# Patient Record
Sex: MALE | Race: WHITE
[De-identification: names, ages, dates, MRNs, and addresses within clinical notes are randomized per-mention and may not be internally consistent; named-entity substitution may affect disease eponyms.]

---

## 2019-12-12 ENCOUNTER — HOSPITAL ENCOUNTER (EMERGENCY)
Dept: HOSPITAL 96 - M.ERS | Age: 69
Discharge: HOME | End: 2019-12-12
Payer: MEDICARE

## 2019-12-12 ENCOUNTER — HOSPITAL ENCOUNTER (OUTPATIENT)
Dept: HOSPITAL 96 - M.CT | Age: 69
End: 2019-12-12
Attending: FAMILY MEDICINE
Payer: MEDICARE

## 2019-12-12 VITALS — HEIGHT: 66 IN | WEIGHT: 212 LBS | BODY MASS INDEX: 34.07 KG/M2

## 2019-12-12 VITALS — SYSTOLIC BLOOD PRESSURE: 127 MMHG | DIASTOLIC BLOOD PRESSURE: 79 MMHG

## 2019-12-12 DIAGNOSIS — E78.00: ICD-10-CM

## 2019-12-12 DIAGNOSIS — R91.8: Primary | ICD-10-CM

## 2019-12-12 DIAGNOSIS — J18.9: Primary | ICD-10-CM

## 2019-12-12 DIAGNOSIS — Z88.0: ICD-10-CM

## 2019-12-12 LAB
ABSOLUTE BASOPHILS: 0 THOU/UL (ref 0–0.2)
ABSOLUTE EOSINOPHILS: 0.2 THOU/UL (ref 0–0.7)
ABSOLUTE MONOCYTES: 0.7 THOU/UL (ref 0–1.2)
ALBUMIN SERPL-MCNC: 4.1 G/DL (ref 3.4–5)
ALP SERPL-CCNC: 77 U/L (ref 46–116)
ALT SERPL-CCNC: 26 U/L (ref 30–65)
ANION GAP SERPL CALC-SCNC: 8 MMOL/L (ref 7–16)
AST SERPL-CCNC: 16 U/L (ref 15–37)
BASOPHILS NFR BLD AUTO: 0.9 %
BILIRUB SERPL-MCNC: 0.5 MG/DL
BUN SERPL-MCNC: 20 MG/DL (ref 7–18)
CALCIUM SERPL-MCNC: 8.7 MG/DL (ref 8.5–10.1)
CHLORIDE SERPL-SCNC: 105 MMOL/L (ref 98–107)
CO2 SERPL-SCNC: 30 MMOL/L (ref 21–32)
CREAT SERPL-MCNC: 1.3 MG/DL (ref 0.6–1.3)
EOSINOPHIL NFR BLD: 3.6 %
GLUCOSE SERPL-MCNC: 116 MG/DL (ref 70–99)
GRANULOCYTES NFR BLD MANUAL: 63.6 %
HCT VFR BLD CALC: 46.2 % (ref 42–52)
HGB BLD-MCNC: 15.4 GM/DL (ref 14–18)
LYMPHOCYTES # BLD: 0.9 THOU/UL (ref 0.8–5.3)
LYMPHOCYTES NFR BLD AUTO: 17.4 %
MCH RBC QN AUTO: 29.4 PG (ref 26–34)
MCHC RBC AUTO-ENTMCNC: 33.3 G/DL (ref 28–37)
MCV RBC: 88.1 FL (ref 80–100)
MONOCYTES NFR BLD: 14.5 %
MPV: 8 FL. (ref 7.2–11.1)
NEUTROPHILS # BLD: 3.2 THOU/UL (ref 1.6–8.1)
NT-PRO BRAIN NAT PEPTIDE: 218 PG/ML (ref ?–300)
NUCLEATED RBCS: 0 /100WBC
PLATELET COUNT*: 161 THOU/UL (ref 150–400)
POTASSIUM SERPL-SCNC: 4.3 MMOL/L (ref 3.5–5.1)
PROT SERPL-MCNC: 7 G/DL (ref 6.4–8.2)
RBC # BLD AUTO: 5.25 MIL/UL (ref 4.5–6)
RDW-CV: 14 % (ref 10.5–14.5)
SODIUM SERPL-SCNC: 143 MMOL/L (ref 136–145)
WBC # BLD AUTO: 5 THOU/UL (ref 4–11)

## 2019-12-13 NOTE — EKG
[place] [address]
Phone:  [phone]                     ELECTROCARDIOGRAM REPORT      
_______________________________________________________________________________
 
Name:       DONALDO VIVAS                Room:                      [facility]#:  [id number]      Account #:      [id number]  
Admission:  19     Attend Phys:                         
Discharge:  19     Date of Birth:  [birth year]  
         Report #: [id number]
    [id number]
_______________________________________________________________________________
THIS REPORT FOR:  //name//                      
 
                         [facility] ED
                                       
Test Date:    2019               Test Time:    17:36:03
Pat Name:     DONALDO VIVAS            Department:   
Patient ID:   [id number]            Room:          
Gender:       M                        Technician:   
:          1950               Requested By: David Mccord
Order Number: [id number] MD:   Ric Castaneda
                                 Measurements
Intervals                              Axis          
Rate:         80                       P:            36
RI:           150                      QRS:          45
QRSD:         100                      T:            47
QT:           385                                    
QTc:          445                                    
                           Interpretive Statements
Sinus rhythm
Nonspecific T abnormalities, anterior leads
Baseline wander in lead(s) I,V1,V2,V3
No previous ECG available for comparison
 
Electronically Signed On 2019 10:30:30 CST by Ric Castaneda
[email or web address]
 
 
 
 
 
 
 
 
 
 
 
 
 
 
 
 
 
 
  <ELECTRONICALLY SIGNED>
                                           By: Ric Castaneda MD, [facility]      
  19     1030
D: 19 173   _____________________________________
T: 19 173   Ric Castaneda MD, FACC        /EPI

## 2020-03-12 ENCOUNTER — HOSPITAL ENCOUNTER (OUTPATIENT)
Dept: HOSPITAL 96 - M.LAB | Age: 70
End: 2020-03-12
Attending: FAMILY MEDICINE
Payer: MEDICARE

## 2020-03-12 DIAGNOSIS — J43.9: Primary | ICD-10-CM

## 2020-03-12 DIAGNOSIS — R91.1: ICD-10-CM

## 2020-03-12 LAB
BUN SERPL-MCNC: 12 MG/DL (ref 7–18)
CREAT SERPL-MCNC: 1.1 MG/DL (ref 0.6–1.3)

## 2021-11-23 ENCOUNTER — HOSPITAL ENCOUNTER (INPATIENT)
Dept: HOSPITAL 96 - M.ERS | Age: 71
LOS: 4 days | Discharge: HOME | DRG: 177 | End: 2021-11-27
Attending: INTERNAL MEDICINE | Admitting: INTERNAL MEDICINE
Payer: MEDICARE

## 2021-11-23 VITALS — WEIGHT: 210 LBS | HEIGHT: 67.01 IN | BODY MASS INDEX: 32.96 KG/M2

## 2021-11-23 VITALS — DIASTOLIC BLOOD PRESSURE: 73 MMHG | SYSTOLIC BLOOD PRESSURE: 124 MMHG

## 2021-11-23 VITALS — SYSTOLIC BLOOD PRESSURE: 140 MMHG | DIASTOLIC BLOOD PRESSURE: 84 MMHG

## 2021-11-23 DIAGNOSIS — U07.1: Primary | ICD-10-CM

## 2021-11-23 DIAGNOSIS — J12.82: ICD-10-CM

## 2021-11-23 DIAGNOSIS — E78.5: ICD-10-CM

## 2021-11-23 DIAGNOSIS — M19.90: ICD-10-CM

## 2021-11-23 DIAGNOSIS — N40.0: ICD-10-CM

## 2021-11-23 DIAGNOSIS — J96.01: ICD-10-CM

## 2021-11-23 DIAGNOSIS — Z88.0: ICD-10-CM

## 2021-11-23 DIAGNOSIS — I10: ICD-10-CM

## 2021-11-23 LAB
ABSOLUTE BASOPHILS: 0 THOU/UL (ref 0–0.2)
ABSOLUTE EOSINOPHILS: 0 THOU/UL (ref 0–0.7)
ABSOLUTE MONOCYTES: 0.6 THOU/UL (ref 0–1.2)
ALBUMIN SERPL-MCNC: 2.5 G/DL (ref 3.4–5)
ALP SERPL-CCNC: 78 U/L (ref 46–116)
ALT SERPL-CCNC: 110 U/L (ref 30–65)
ANION GAP SERPL CALC-SCNC: 7 MMOL/L (ref 7–16)
AST SERPL-CCNC: 97 U/L (ref 15–37)
BASOPHILS NFR BLD AUTO: 0.2 %
BILIRUB SERPL-MCNC: 1.7 MG/DL
BILIRUB UR-MCNC: (no result) MG/DL
BUN SERPL-MCNC: 20 MG/DL (ref 7–18)
CALCIUM SERPL-MCNC: 8.6 MG/DL (ref 8.5–10.1)
CHLORIDE SERPL-SCNC: 100 MMOL/L (ref 98–107)
CO2 SERPL-SCNC: 29 MMOL/L (ref 21–32)
COLOR UR: YELLOW
CREAT SERPL-MCNC: 1.1 MG/DL (ref 0.6–1.3)
EOSINOPHIL NFR BLD: 0.5 %
GLUCOSE SERPL-MCNC: 150 MG/DL (ref 70–99)
GRANULOCYTES NFR BLD MANUAL: 77.8 %
HCT VFR BLD CALC: 42.5 % (ref 42–52)
HGB BLD-MCNC: 14.6 GM/DL (ref 14–18)
KETONES UR STRIP-MCNC: (no result) MG/DL
LYMPHOCYTES # BLD: 0.9 THOU/UL (ref 0.8–5.3)
LYMPHOCYTES NFR BLD AUTO: 13.3 %
MCH RBC QN AUTO: 29 PG (ref 26–34)
MCHC RBC AUTO-ENTMCNC: 34.4 G/DL (ref 28–37)
MCV RBC: 84.5 FL (ref 80–100)
MONOCYTES NFR BLD: 8.2 %
MPV: 7.3 FL. (ref 7.2–11.1)
MUCUS: (no result) STRN/LPF
NEUTROPHILS # BLD: 5.3 THOU/UL (ref 1.6–8.1)
NUCLEATED RBCS: 0 /100WBC
PLATELET COUNT*: 222 THOU/UL (ref 150–400)
POTASSIUM SERPL-SCNC: 5 MMOL/L (ref 3.5–5.1)
PROT SERPL-MCNC: 7.4 G/DL (ref 6.4–8.2)
PROT UR QL STRIP: (no result)
RBC # BLD AUTO: 5.03 MIL/UL (ref 4.5–6)
RBC # UR STRIP: NEGATIVE /UL
RBC #/AREA URNS HPF: (no result) /HPF (ref 0–2)
RDW-CV: 13.8 % (ref 10.5–14.5)
SODIUM SERPL-SCNC: 136 MMOL/L (ref 136–145)
SP GR UR STRIP: 1.01 (ref 1–1.03)
SQUAMOUS: (no result) /LPF (ref 0–3)
URINE CLARITY: CLEAR
URINE GLUCOSE-RANDOM: NEGATIVE
URINE LEUKOCYTES-REFLEX: NEGATIVE
URINE NITRITE-REFLEX: NEGATIVE
URINE WBC-REFLEX: (no result) /HPF (ref 0–5)
UROBILINOGEN UR STRIP-ACNC: 1 E.U./DL (ref 0.2–1)
WBC # BLD AUTO: 6.9 THOU/UL (ref 4–11)

## 2021-11-23 PROCEDURE — XW033E5 INTRODUCTION OF REMDESIVIR ANTI-INFECTIVE INTO PERIPHERAL VEIN, PERCUTANEOUS APPROACH, NEW TECHNOLOGY GROUP 5: ICD-10-PCS | Performed by: INTERNAL MEDICINE

## 2021-11-23 NOTE — EKG
[place] [address]
Phone:  [phone]                     ELECTROCARDIOGRAM REPORT      
_______________________________________________________________________________
 
Name:         DONALDO VIVAS               Room:          [place] [address]    ADM IN 
[facility]#:    [id number]     Account #:     [id number]  
Admission:    21    Attend Phys:   Marco Saldivar, 
Discharge:                Date of Birth: [birth year]  
Date of Service: 21 1313  Report #:      [id number]
        [id number]
_______________________________________________________________________________
THIS REPORT FOR:  //name//                      
 
                         [facility] ED
                                       
Test Date:    2021               Test Time:    13:13:53
Pat Name:     DONALDO VIVAS            Department:   
Patient ID:   [id number]            Room:         [facility]
Gender:       M                        Technician:   MELANY
:          1950               Requested By: Tj De Los Santos
Order Number: [id number] MD:   Ric Castaneda
                                 Measurements
Intervals                              Axis          
Rate:         74                       P:            48
MS:           163                      QRS:          11
QRSD:         89                       T:            49
QT:           377                                    
QTc:          419                                    
                           Interpretive Statements
Sinus arrhythmia
nonspecific t wave changes
Baseline wander in lead(s) III
Compared to ECG 2019 17:36:03
no change
Electronically Signed On 2021 15:39:33 CST by Ric Castaneda
[email or web address]
 
 
 
 
 
 
 
 
 
 
 
 
 
 
 
 
 
 
 
  <ELECTRONICALLY SIGNED>
                                           By: Ric Castaneda MD, FAC      
  21     1539
D: 21 1313   _____________________________________
T: 21 1313   Ric Castaneda MD, [facility]        /EPI

## 2021-11-24 VITALS — DIASTOLIC BLOOD PRESSURE: 89 MMHG | SYSTOLIC BLOOD PRESSURE: 119 MMHG

## 2021-11-24 VITALS — SYSTOLIC BLOOD PRESSURE: 125 MMHG | DIASTOLIC BLOOD PRESSURE: 81 MMHG

## 2021-11-24 VITALS — SYSTOLIC BLOOD PRESSURE: 131 MMHG | DIASTOLIC BLOOD PRESSURE: 81 MMHG

## 2021-11-24 VITALS — SYSTOLIC BLOOD PRESSURE: 126 MMHG | DIASTOLIC BLOOD PRESSURE: 82 MMHG

## 2021-11-24 VITALS — DIASTOLIC BLOOD PRESSURE: 85 MMHG | SYSTOLIC BLOOD PRESSURE: 153 MMHG

## 2021-11-24 LAB
ABSOLUTE BASOPHILS: 0 THOU/UL (ref 0–0.2)
ABSOLUTE EOSINOPHILS: 0 THOU/UL (ref 0–0.7)
ABSOLUTE MONOCYTES: 0.4 THOU/UL (ref 0–1.2)
ANION GAP SERPL CALC-SCNC: 8 MMOL/L (ref 7–16)
BASOPHILS NFR BLD AUTO: 0.4 %
BUN SERPL-MCNC: 19 MG/DL (ref 7–18)
CALCIUM SERPL-MCNC: 8.7 MG/DL (ref 8.5–10.1)
CHLORIDE SERPL-SCNC: 102 MMOL/L (ref 98–107)
CO2 SERPL-SCNC: 29 MMOL/L (ref 21–32)
CREAT SERPL-MCNC: 1.1 MG/DL (ref 0.6–1.3)
EOSINOPHIL NFR BLD: 0 %
GLUCOSE SERPL-MCNC: 144 MG/DL (ref 70–99)
GRANULOCYTES NFR BLD MANUAL: 77.7 %
HCT VFR BLD CALC: 41.7 % (ref 42–52)
HGB BLD-MCNC: 14.4 GM/DL (ref 14–18)
LYMPHOCYTES # BLD: 0.7 THOU/UL (ref 0.8–5.3)
LYMPHOCYTES NFR BLD AUTO: 13.5 %
MCH RBC QN AUTO: 29.5 PG (ref 26–34)
MCHC RBC AUTO-ENTMCNC: 34.6 G/DL (ref 28–37)
MCV RBC: 85.5 FL (ref 80–100)
MONOCYTES NFR BLD: 8.4 %
MPV: 6.8 FL. (ref 7.2–11.1)
NEUTROPHILS # BLD: 4 THOU/UL (ref 1.6–8.1)
NUCLEATED RBCS: 0 /100WBC
PLATELET COUNT*: 256 THOU/UL (ref 150–400)
POTASSIUM SERPL-SCNC: 4.6 MMOL/L (ref 3.5–5.1)
RBC # BLD AUTO: 4.88 MIL/UL (ref 4.5–6)
RDW-CV: 14 % (ref 10.5–14.5)
SODIUM SERPL-SCNC: 139 MMOL/L (ref 136–145)
WBC # BLD AUTO: 5.1 THOU/UL (ref 4–11)

## 2021-11-25 VITALS — SYSTOLIC BLOOD PRESSURE: 109 MMHG | DIASTOLIC BLOOD PRESSURE: 78 MMHG

## 2021-11-25 VITALS — DIASTOLIC BLOOD PRESSURE: 74 MMHG | SYSTOLIC BLOOD PRESSURE: 111 MMHG

## 2021-11-25 VITALS — SYSTOLIC BLOOD PRESSURE: 120 MMHG | DIASTOLIC BLOOD PRESSURE: 69 MMHG

## 2021-11-25 VITALS — SYSTOLIC BLOOD PRESSURE: 121 MMHG | DIASTOLIC BLOOD PRESSURE: 79 MMHG

## 2021-11-25 VITALS — DIASTOLIC BLOOD PRESSURE: 76 MMHG | SYSTOLIC BLOOD PRESSURE: 129 MMHG

## 2021-11-25 VITALS — SYSTOLIC BLOOD PRESSURE: 140 MMHG | DIASTOLIC BLOOD PRESSURE: 89 MMHG

## 2021-11-25 LAB
ABSOLUTE BASOPHILS: 0 THOU/UL (ref 0–0.2)
ABSOLUTE EOSINOPHILS: 0 THOU/UL (ref 0–0.7)
ABSOLUTE MONOCYTES: 0.8 THOU/UL (ref 0–1.2)
ALBUMIN SERPL-MCNC: 2.8 G/DL (ref 3.4–5)
ALP SERPL-CCNC: 75 U/L (ref 46–116)
ALT SERPL-CCNC: 121 U/L (ref 30–65)
ANION GAP SERPL CALC-SCNC: 8 MMOL/L (ref 7–16)
APTT BLD: 26.4 SECONDS (ref 25–31.3)
AST SERPL-CCNC: 81 U/L (ref 15–37)
BASOPHILS NFR BLD AUTO: 0.1 %
BILIRUB SERPL-MCNC: 1.3 MG/DL
BUN SERPL-MCNC: 23 MG/DL (ref 7–18)
CALCIUM SERPL-MCNC: 8.6 MG/DL (ref 8.5–10.1)
CHLORIDE SERPL-SCNC: 104 MMOL/L (ref 98–107)
CO2 SERPL-SCNC: 28 MMOL/L (ref 21–32)
CREAT SERPL-MCNC: 1.2 MG/DL (ref 0.6–1.3)
EOSINOPHIL NFR BLD: 0 %
GLUCOSE SERPL-MCNC: 105 MG/DL (ref 70–99)
GRANULOCYTES NFR BLD MANUAL: 71.7 %
HCT VFR BLD CALC: 40.7 % (ref 42–52)
HGB BLD-MCNC: 13.9 GM/DL (ref 14–18)
INR PPP: 1
LYMPHOCYTES # BLD: 1.4 THOU/UL (ref 0.8–5.3)
LYMPHOCYTES NFR BLD AUTO: 17.8 %
MAGNESIUM SERPL-MCNC: 2.1 MG/DL (ref 1.8–2.4)
MCH RBC QN AUTO: 29.2 PG (ref 26–34)
MCHC RBC AUTO-ENTMCNC: 34.3 G/DL (ref 28–37)
MCV RBC: 85.2 FL (ref 80–100)
MONOCYTES NFR BLD: 10.4 %
MPV: 7.3 FL. (ref 7.2–11.1)
NEUTROPHILS # BLD: 5.5 THOU/UL (ref 1.6–8.1)
NUCLEATED RBCS: 0 /100WBC
PLATELET COUNT*: 333 THOU/UL (ref 150–400)
POTASSIUM SERPL-SCNC: 4.4 MMOL/L (ref 3.5–5.1)
PROT SERPL-MCNC: 6.6 G/DL (ref 6.4–8.2)
PROTHROMBIN TIME: 10.5 SECONDS (ref 9.2–11.5)
RBC # BLD AUTO: 4.78 MIL/UL (ref 4.5–6)
RDW-CV: 13.9 % (ref 10.5–14.5)
SODIUM SERPL-SCNC: 140 MMOL/L (ref 136–145)
WBC # BLD AUTO: 7.6 THOU/UL (ref 4–11)

## 2021-11-26 VITALS — SYSTOLIC BLOOD PRESSURE: 103 MMHG | DIASTOLIC BLOOD PRESSURE: 65 MMHG

## 2021-11-26 VITALS — DIASTOLIC BLOOD PRESSURE: 66 MMHG | SYSTOLIC BLOOD PRESSURE: 113 MMHG

## 2021-11-26 VITALS — SYSTOLIC BLOOD PRESSURE: 144 MMHG | DIASTOLIC BLOOD PRESSURE: 75 MMHG

## 2021-11-26 VITALS — DIASTOLIC BLOOD PRESSURE: 68 MMHG | SYSTOLIC BLOOD PRESSURE: 114 MMHG

## 2021-11-26 VITALS — SYSTOLIC BLOOD PRESSURE: 142 MMHG | DIASTOLIC BLOOD PRESSURE: 85 MMHG

## 2021-11-26 VITALS — SYSTOLIC BLOOD PRESSURE: 111 MMHG | DIASTOLIC BLOOD PRESSURE: 66 MMHG

## 2021-11-26 LAB
ABSOLUTE BASOPHILS: 0 THOU/UL (ref 0–0.2)
ABSOLUTE EOSINOPHILS: 0 THOU/UL (ref 0–0.7)
ABSOLUTE MONOCYTES: 0.8 THOU/UL (ref 0–1.2)
ALBUMIN SERPL-MCNC: 2.6 G/DL (ref 3.4–5)
ALP SERPL-CCNC: 68 U/L (ref 46–116)
ALT SERPL-CCNC: 87 U/L (ref 30–65)
ANION GAP SERPL CALC-SCNC: 6 MMOL/L (ref 7–16)
ANION GAP SERPL CALC-SCNC: 8 MMOL/L (ref 7–16)
AST SERPL-CCNC: 46 U/L (ref 15–37)
BASOPHILS NFR BLD AUTO: 0.1 %
BILIRUB SERPL-MCNC: 1.3 MG/DL
BUN SERPL-MCNC: 23 MG/DL (ref 7–18)
BUN SERPL-MCNC: 25 MG/DL (ref 7–18)
CALCIUM SERPL-MCNC: 8.6 MG/DL (ref 8.5–10.1)
CALCIUM SERPL-MCNC: 8.7 MG/DL (ref 8.5–10.1)
CHLORIDE SERPL-SCNC: 103 MMOL/L (ref 98–107)
CHLORIDE SERPL-SCNC: 105 MMOL/L (ref 98–107)
CO2 SERPL-SCNC: 27 MMOL/L (ref 21–32)
CO2 SERPL-SCNC: 30 MMOL/L (ref 21–32)
CREAT SERPL-MCNC: 1.2 MG/DL (ref 0.6–1.3)
CREAT SERPL-MCNC: 1.3 MG/DL (ref 0.6–1.3)
EOSINOPHIL NFR BLD: 0.1 %
GLUCOSE SERPL-MCNC: 216 MG/DL (ref 70–99)
GLUCOSE SERPL-MCNC: 95 MG/DL (ref 70–99)
GRANULOCYTES NFR BLD MANUAL: 68.1 %
HCT VFR BLD CALC: 38.7 % (ref 42–52)
HGB BLD-MCNC: 13.1 GM/DL (ref 14–18)
LYMPHOCYTES # BLD: 1.4 THOU/UL (ref 0.8–5.3)
LYMPHOCYTES NFR BLD AUTO: 19.7 %
M PNEUMO IGG SER IA-ACNC: 267 U/ML (ref 0–99)
M PNEUMO IGM SER IA-ACNC: <770 U/ML (ref 0–769)
MCH RBC QN AUTO: 29.1 PG (ref 26–34)
MCHC RBC AUTO-ENTMCNC: 34 G/DL (ref 28–37)
MCV RBC: 85.7 FL (ref 80–100)
MONOCYTES NFR BLD: 12 %
MPV: 7.1 FL. (ref 7.2–11.1)
NEUTROPHILS # BLD: 4.7 THOU/UL (ref 1.6–8.1)
NUCLEATED RBCS: 0 /100WBC
PLATELET COUNT*: 329 THOU/UL (ref 150–400)
POTASSIUM SERPL-SCNC: 4.3 MMOL/L (ref 3.5–5.1)
POTASSIUM SERPL-SCNC: 4.6 MMOL/L (ref 3.5–5.1)
PREALB SERPL-MCNC: 20.3 MG/DL (ref 18–35.7)
PROT SERPL-MCNC: 6.4 G/DL (ref 6.4–8.2)
RBC # BLD AUTO: 4.52 MIL/UL (ref 4.5–6)
RDW-CV: 13.8 % (ref 10.5–14.5)
SODIUM SERPL-SCNC: 138 MMOL/L (ref 136–145)
SODIUM SERPL-SCNC: 141 MMOL/L (ref 136–145)
WBC # BLD AUTO: 6.9 THOU/UL (ref 4–11)

## 2021-11-27 VITALS — SYSTOLIC BLOOD PRESSURE: 105 MMHG | DIASTOLIC BLOOD PRESSURE: 57 MMHG

## 2021-11-27 VITALS — SYSTOLIC BLOOD PRESSURE: 127 MMHG | DIASTOLIC BLOOD PRESSURE: 63 MMHG

## 2021-11-27 VITALS — DIASTOLIC BLOOD PRESSURE: 70 MMHG | SYSTOLIC BLOOD PRESSURE: 151 MMHG

## 2021-11-27 VITALS — DIASTOLIC BLOOD PRESSURE: 48 MMHG | SYSTOLIC BLOOD PRESSURE: 128 MMHG

## 2021-11-27 LAB
ABSOLUTE BASOPHILS: 0 THOU/UL (ref 0–0.2)
ABSOLUTE EOSINOPHILS: 0 THOU/UL (ref 0–0.7)
ABSOLUTE MONOCYTES: 1 THOU/UL (ref 0–1.2)
ALBUMIN SERPL-MCNC: 2.6 G/DL (ref 3.4–5)
ALP SERPL-CCNC: 57 U/L (ref 46–116)
ALT SERPL-CCNC: 85 U/L (ref 30–65)
ANION GAP SERPL CALC-SCNC: 7 MMOL/L (ref 7–16)
AST SERPL-CCNC: 46 U/L (ref 15–37)
BASOPHILS NFR BLD AUTO: 0.1 %
BILIRUB SERPL-MCNC: 1.3 MG/DL
BUN SERPL-MCNC: 23 MG/DL (ref 7–18)
CALCIUM SERPL-MCNC: 8.5 MG/DL (ref 8.5–10.1)
CHLORIDE SERPL-SCNC: 104 MMOL/L (ref 98–107)
CO2 SERPL-SCNC: 29 MMOL/L (ref 21–32)
CREAT SERPL-MCNC: 1.1 MG/DL (ref 0.6–1.3)
EOSINOPHIL NFR BLD: 0.1 %
GLUCOSE SERPL-MCNC: 103 MG/DL (ref 70–99)
GRANULOCYTES NFR BLD MANUAL: 70.4 %
HCT VFR BLD CALC: 37.2 % (ref 42–52)
HGB BLD-MCNC: 12.7 GM/DL (ref 14–18)
LYMPHOCYTES # BLD: 1.4 THOU/UL (ref 0.8–5.3)
LYMPHOCYTES NFR BLD AUTO: 17.5 %
MAGNESIUM SERPL-MCNC: 2 MG/DL (ref 1.8–2.4)
MCH RBC QN AUTO: 28.9 PG (ref 26–34)
MCHC RBC AUTO-ENTMCNC: 34.2 G/DL (ref 28–37)
MCV RBC: 84.6 FL (ref 80–100)
MONOCYTES NFR BLD: 11.9 %
MPV: 7.2 FL. (ref 7.2–11.1)
NEUTROPHILS # BLD: 5.7 THOU/UL (ref 1.6–8.1)
NUCLEATED RBCS: 0 /100WBC
PHOSPHATE SERPL-MCNC: 3.8 MG/DL (ref 2.5–4.9)
PLATELET COUNT*: 342 THOU/UL (ref 150–400)
POTASSIUM SERPL-SCNC: 3.9 MMOL/L (ref 3.5–5.1)
PROT SERPL-MCNC: 6.3 G/DL (ref 6.4–8.2)
RBC # BLD AUTO: 4.4 MIL/UL (ref 4.5–6)
RDW-CV: 13.9 % (ref 10.5–14.5)
SODIUM SERPL-SCNC: 140 MMOL/L (ref 136–145)
WBC # BLD AUTO: 8.1 THOU/UL (ref 4–11)

## 2021-11-27 NOTE — NUR
Alert and oriented x 4. He is on O2 at 4L n/c and up with stand by assist. He
has voided per urinal. No complaints of pain. He hasn't seemed to sleep that
much this shift. Vitals are stable,HR stable.
CALLED EDMUNDO VIVAS AND GAVE UP DATE ON DONALDO.
Infection Control:  Per [facility] patient had a
positive Covid PCR test on 11/15/21.
NO ACUTE CHANGES THIS SHIFT. PT REMAINS ON 4L O2. NO BRADYCARDIA EVENTS ON
TELE. PT OUT OF ISOLATION THIS SHIFT, WIFE CAME TO VISIT.
PLAN FOR LABS, CXR IN AM, WALKING O2 STUDY, AND POSSIBLE D/C TOMORROW.
PATIENT PLACED IN HOSPITAL BED
PT ALERT AND ORIENTED, 3L-O2 NC. RECEIVED ALL MEDS/ABX AS SCHEDULED. HE SLEPT
VERY WELL THIS SHIFT. NO REPORTS OF ANY PAIN N/V. HE IS UP AD BAM TO RESTROOM
PT ALERT AND ORIENTED, RESTING COMFORTABLY IN BED, OXYGEN AT 4L-NC AT 92-93%.
HE IS UP AD BAM TO RESTROOM AND VERY STABLE. NO COMPLAINTS OF
PAIN/NAUSEA/VOMITING. HE RECEIVED ABX AND MEDS AS SCHEDULED. NPO SINCE
MIDNIGHT. WILL CONTINUE TO MONITOR.
PT BETWEEN 3-4L O2 THIS SHIFT VIA NC. UP AD BAM. NO CHANGES IN ORIENTATION OR
RHYTHM. PLAN CONTINUE TO WEAN O2 AS ABLE, ENCOURAGE INCENTIVE SPIROMETER,
AMBULATE. PT HSC ON D/C
PT GIVEN HEART HEALTHY DIET BREAKFAST TRAY AT THIS TIME.
PT ORIENTED TO ROOM AND UNIT, BED LOW AND LOCKED, SIDE RAILS UPX3, CALL LIGHT
IN REACH, TELE APPLIED. WILL CONTINUE TO ASSESS.
PT OXYEN REQUIREMENT INCREASE TO 4LNC. OBTAIN PULMONARY CONSULT AND WILL HAVE
RT INSTRUCT ON USE OF INCENTIVE SPIROMETER.
PTS FIRST POSITIVE COVID TEST WAS ON NOV 15, THEREFORE OUT OF ISOLATION.
DIETARY NOTIFEID. PT WIFE NOTIFIED. CHARGE RN HIMA NOTIFIED.
THIS RN WENT INTO PTS ROOM FOR AM VITALS. HE EXPLAINED TO THIS RN THAT AROUND
6 TO 6:30 AM, PRIOR TO THIS RN SHIFT AND STILL ON NIGHT SHIFT, HE 'PASSED OUT
IN THE BATHROOM, WOKE UP ON THE GROUND HALF IN THE SHOWER, BY THE SINK.' PT
STATED HE DID NOT THINK HE HIT HIS HEAD BUT WAS UNSURE. ALERT, ORIENTED X4.
ASSESSMENT UNCHANGED FROM PRIOR SHIFT EXCEPT FOR SKIN TEAR L FOREARM, DRIED,
NOT ACTIVELY BLEEDING. ELANA, HOUSE SUPERVISOR NOTIFIED. PTS WIFE WAS ON THE
PHONE WITH PT WHILE PT TOLD THIS RN; THEREFORE IS NOTIFEID. DR SUTHERLAND PAGED
AT THIS TIME.
UPDATED PTS WIFE VIA PHONE CALL. ANSWERED ALL QUESTIONS.
UPDATED PTS WIFE VIA PHONE. ANSWERED ALL QUESTIONS. PTS WIFE HAD THIS RN ON
SPEAKER WITH PTS SON, UPDATED AS WELL W PT PERMISSION
144

## 2021-11-29 NOTE — CON
[address]  [address]                    CONSULTATION                  
_______________________________________________________________________________
 
Name:       DONALDO VIVAS GRAHAM                Room:           [address] [place]  
.R.#:  [id number]      Account #:      [id number]  
Admission:  11/23/21     Attend Phys:    Marco Saldivar MD 
Discharge:  11/27/21     Date of Birth:  [birth year]  
         Report #: [id number]
                                                                     [id number]
_______________________________________________________________________________
THIS REPORT FOR:  
 
cc:  Willy Nino Steve T. DO Pervez, Adeel MD                                                   ~
 
 
DATE OF CONSULTATION: 11/24/2021
 
REQUESTING PHYSICIAN:  Marco Saldivar MD
 
INDICATION FOR CONSULTATION:  Acute hypoxemic respiratory failure secondary to 
COVID-19.
 
HISTORY OF PRESENT ILLNESS:  A 71-year-old gentleman.  He has no known history 
of smoking and does not have a history of a cardiac or respiratory disease in 
the past.  He has not been vaccinated for COVID-19.  The patient is reported to 
have tested positive to COVID-19 as an outpatient on 11/15.  He is not reported 
to have had any medications for this prior to this admission.
 
The patient currently is hypoxemic on room air.  He is, however, saturating 92% 
on 3 liters nasal cannula.  He is hemodynamically stable.  He is not febrile.  
He does have shortness of breath at rest.  He does have a cough.  He has small 
amounts of clear and white sputum.  No chest pain.  Does not have upper 
respiratory complaints.  No swelling of lower extremities or calf pain.  The 
patient appears to be fairly anxious.  He is particularly anxious about having a
reaction to any of the medications he received.  He is particularly worried 
about remdesivir; however, he has agreed to receive the medication.  The patient
does have a mild LFTs elevation on the lab work yesterday and he did have a CTA 
chest performed yesterday, which shows bilateral infiltrates, some of these are 
fairly dense and lobar and not typical for COVID-19.
 
REVIEW OF SYSTEMS:  The patient's review of systems for 12 points is negative 
except as mentioned above.
 
PAST MEDICAL HISTORY:  Shoulder surgery, hand surgery, hyperlipidemia, benign 
prostatic hypertrophy.
 
SOCIAL HISTORY:  Lifetime nonsmoker.  No known history of heavy alcohol use or 
illegal drug use.
 
CURRENT MEDICATIONS:  List in [facility] reviewed.
 
HOME MEDICATIONS:  List also in [facility] reviewed.
 
FAMILY HISTORY:  No pertinent family history.
 
 
 
[place] [address]                    CONSULTATION                  
_______________________________________________________________________________
 
Name:       DONALDO VIVAS                Room:           M.107-P    DIS IN  
M.R.#:  [id number]      Account #:      [id number]  
Admission:  11/23/21     Attend Phys:    Marco Saldivar MD 
Discharge:  11/27/21     Date of Birth:  [birth year]  
         Report #: [id number]
                                                                     [id number]
_______________________________________________________________________________
 
 
 
ALLERGIES:  THE PATIENT STATES THAT HE SWELLED UP MANY YEARS AGO AFTER RECEIVING
PENICILLIN.
 
PHYSICAL EXAMINATION:
GENERAL:  He is alert, awake and oriented.
VITAL SIGNS:  Has a pulse of 97 and a blood pressure of 126/82.  He is 
saturating 92%.  He is on 3 liters nasal cannula.  Respiratory rate is around 
16-17.  He is afebrile with a temperature of 36.4.
HEENT:  Head is normocephalic and atraumatic.
NECK:  Does not show raised JVP.
CHEST:  Breath sounds are bilaterally equal.  No added sounds.
HEART:  Regular.  No murmur.
ABDOMEN:  Soft and nontender.
EXTREMITIES:  Lower extremities, no edema and no calf tenderness.
 
LABORATORY DATA:  CTA chest as discussed above.  Lab work also as discussed 
above.  His COVID-19 PCR is pending.  Antigen at our hospital was negative.  He 
is reported to be positive for COVID-19 previously.
 
ASSESSMENT/PLAN:
1.  Acute hypoxemic respiratory failure secondary to COVID-19.  Continue to 
titrate oxygen, out of bed to chair as tolerated.  Avoid sleeping supine, prone 
position preferred.  We will also go ahead and give him Brovana.
2.  COVID-19.  He is on dexamethasone at 6 mg daily.  We will continue the same.
 The patient had already agreed to remdesivir before I saw him.  He will say 
that he is anxious about side effects.  There is elevation in LFTs as above; 
however, the potential benefit of remdesivir, still does appear to outweigh 
risks and therefore, I recommend that we proceed.  However, we will watch LFTs 
closely.
3.  Lobar pulmonary infiltrates.  These are noted on CT.  These are consistent 
with a secondary bacterial infection and not typical for COVID; therefore, I 
would like to give him more strep and gram-negative coverage.  The patient is 
already on doxycycline and he is worried about having reactions to medications. 
Likely, he will tolerate cephalosporins, but it is for this reason that I give 
him Levaquin instead.  For now, we will also continue with doxycycline for 
providing some MRSA coverage as well.  More cultures and serologies are ordered.
 If nasal swab for MRSA and sputum culture negative for MRSA and he improves, 
then later on we can discontinue doxycycline and only continuing Levaquin.
4.  Elevated liver function tests.  Recommend obtaining a right upper quadrant 
ultrasound.  Also, recommend checking a lipase level.  Still we will proceed 
with remdesivir as above and follow LFTs.
5.  Deep vein thrombosis prophylaxis.  He is on Lovenox.
6.  Clostridium difficile prophylaxis, Lactinex.
 
 
 
[address] [place]  [address]                    CONSULTATION                  
_______________________________________________________________________________
 
Name:       DONALDO VIVAS GRAHAM                Room:           M.107-P    DIS IN  
M.R.#:  [id number]      Account #:      [id number]  
Admission:  11/23/21     Attend Phys:    Marco Saldivar MD 
Discharge:  11/27/21     Date of Birth:  [birth year]  
         Report #: [id number]
                                                                     [id number]
_______________________________________________________________________________
 
 
7.  Hyperglycemia already on an insulin sliding scale.
8.  Gastrointestinal prophylaxis.  He is already on Protonix.
 
Thanks for this consultation.
 
 
 
 
 
 
 
 
 
 
 
 
 
 
 
 
 
 
 
 
 
 
 
 
 
 
 
 
 
 
 
 
 
 
 
 
 
 
<ELECTRONICALLY SIGNED>
                                        By:  Jose F Pelletier MD              
11/29/21     1622
D: 11/24/21 1735_______________________________________
T: 11/24/21 1849Aalaina Pelletier MD                 /nt